# Patient Record
Sex: FEMALE | Race: WHITE | Employment: OTHER | URBAN - METROPOLITAN AREA
[De-identification: names, ages, dates, MRNs, and addresses within clinical notes are randomized per-mention and may not be internally consistent; named-entity substitution may affect disease eponyms.]

---

## 2022-11-08 RX ORDER — LOSARTAN POTASSIUM AND HYDROCHLOROTHIAZIDE 25; 100 MG/1; MG/1
1 TABLET ORAL DAILY
COMMUNITY

## 2022-11-08 RX ORDER — MULTIVITAMIN WITH IRON
250 TABLET ORAL NIGHTLY
COMMUNITY

## 2022-11-08 RX ORDER — CETIRIZINE HYDROCHLORIDE 10 MG/1
1 CAPSULE, LIQUID FILLED ORAL DAILY
COMMUNITY

## 2022-11-08 NOTE — PROGRESS NOTES
DOS 22   47    DR. LEI'S OFFICE CALLED TO FAX H&P AND PRE-OP DOS ORDERS(PRE-OP ORDERS WILL NEED TO BE PUT IN WHEN RECEIVED)      UPDATE   Orders in epic for DARNELL Lei's office contacted and message left with Jessica Cheatham to let Aneta Chow is working from home that we do not have office notes for DOS.

## 2022-11-08 NOTE — PROGRESS NOTES
[address]  time____0630 FOR NEEDLE LOC AT 0800________        Surgery time______1000______    Take the following medications with a sip of water: Follow your MD/Surgeons pre-procedure instructions regarding your medications     Do not eat or drink anything after 12:00 midnight prior to your surgery. This includes water chewing gum, mints and ice chips. You may brush your teeth and gargle the morning of your surgery, but do not swallow the water     Please see your family doctor/pediatrician for a history and physical and/or concerning medications. Bring any test results/reports from your physicians office. If you are under the care of a heart doctor or specialist doctor, please be aware that you may be asked to them for clearance    You may be asked to stop blood thinners such as Coumadin, Plavix, Fragmin, Lovenox, etc., or any anti-inflammatories such as:  Aspirin, Ibuprofen, Advil, Naproxen prior to your surgery. We also ask that you stop any OTC medications such as fish oil, vitamin E, glucosamine, garlic, Multivitamins, COQ 10, etc.    We ask that you do not smoke 24 hours prior to surgery  We ask that you do not  drink any alcoholic beverages 24 hours prior to surgery     You must make arrangements for a responsible adult to take you home after your surgery. For your safety you will not be allowed to leave alone or drive yourself home. Your surgery will be cancelled if you do not have a ride home. Also for your safety, it is strongly suggested that someone stay with you the first 24 hours after your surgery. A parent or legal guardian must accompany a child scheduled for surgery and plan to stay at the hospital until the child is discharged. Please do not bring other children with you. For your comfort, please wear simple loose fitting clothing to the hospital.  Please do not bring valuables.     Do not wear any make-up or nail polish on your fingers or toes      For your safety, please do not wear any jewelry or body piercing's on the day of surgery. All jewelry must be removed. If you have dentures, they will be removed before going to operating room. For your convenience, we will provide you with a container. If you wear contact lenses or glasses, they will be removed, please bring a case for them. If you have a living will and a durable power of  for healthcare, please bring in a copy. As part of our patient safety program to minimize surgical site infections, we ask you to do the following:    Please notify your surgeon if you develop any illness between         now and the  day of your surgery. This includes a cough, cold, fever, sore throat, nausea,         or vomiting, and diarrhea, etc.   Please notify your surgeon if you experience dizziness, shortness         of breath or blurred vision between now and the time of your surgery. Do not shave your operative site 96 hours prior to surgery. For face and neck surgery, men may use an electric razor 48 hours   prior to surgery. You may shower the night before surgery or the morning of   your surgery with an antibacterial soap. You will need to bring a photo ID and insurance card    [facility] has an onsite pharmacy, would you like to utilize our pharmacy     If you will be staying overnight and use a C-pap machine, please bring   your C-pap to hospital     Our goal is to provide you with excellent care, therefore, visitors will be limited to two(2) in the room at a time so that we may focus on providing this care for you. Please contact pre-admission testing if you have any further questions.                  Mackenzie Celaya phone number:  [address] [facility] fax number:  [phone]  Please note these are generalized instructions for all surgical cases, you may be provided with more specific instructions according to your surgery. C-Difficile admission screening and protocol:       * Admitted with diarrhea? [] YES    [x]  NO     *Prior history of C-Diff. In last 3 months? [] YES    [x]  NO     *Antibiotic use in the past 6-8 weeks? [x]  NO    []  YES                 If yes, which ANTIBIOTIC AND REASON______     *Prior hospitalization or nursing home in the last month? []  YES    [x]  NO        SAFETY FIRST. .call before you fall

## 2022-11-16 ENCOUNTER — ANESTHESIA EVENT (OUTPATIENT)
Dept: OPERATING ROOM | Age: 77
End: 2022-11-16
Payer: MEDICARE

## 2022-11-17 ENCOUNTER — HOSPITAL ENCOUNTER (OUTPATIENT)
Dept: WOMENS IMAGING | Age: 77
Discharge: HOME | End: 2022-11-17
Attending: SURGERY
Payer: MEDICARE

## 2022-11-17 ENCOUNTER — APPOINTMENT (OUTPATIENT)
Dept: WOMENS IMAGING | Age: 77
End: 2022-11-17
Attending: SURGERY
Payer: MEDICARE

## 2022-11-17 ENCOUNTER — HOSPITAL ENCOUNTER (OUTPATIENT)
Age: 77
Setting detail: OUTPATIENT SURGERY
Discharge: HOME | End: 2022-11-17
Attending: SURGERY | Admitting: SURGERY
Payer: MEDICARE

## 2022-11-17 ENCOUNTER — ANESTHESIA (OUTPATIENT)
Dept: OPERATING ROOM | Age: 77
End: 2022-11-17
Payer: MEDICARE

## 2022-11-17 VITALS
HEART RATE: 76 BPM | TEMPERATURE: 97.8 F | DIASTOLIC BLOOD PRESSURE: 75 MMHG | WEIGHT: 173.72 LBS | HEIGHT: 63 IN | RESPIRATION RATE: 18 BRPM | OXYGEN SATURATION: 97 % | BODY MASS INDEX: 30.78 KG/M2 | SYSTOLIC BLOOD PRESSURE: 144 MMHG

## 2022-11-17 DIAGNOSIS — D05.12 DUCTAL CARCINOMA IN SITU (DCIS) OF LEFT BREAST: ICD-10-CM

## 2022-11-17 DIAGNOSIS — R92.8 ABNORMAL MAMMOGRAM: ICD-10-CM

## 2022-11-17 DIAGNOSIS — C50.912 MALIGNANT NEOPLASM OF LEFT FEMALE BREAST, UNSPECIFIED ESTROGEN RECEPTOR STATUS, UNSPECIFIED SITE OF BREAST (HCC): ICD-10-CM

## 2022-11-17 LAB
CHOLESTEROL, TOTAL: 182 MG/DL (ref 0–199)
HCT VFR BLD CALC: 34.6 % (ref 36–48)
HDLC SERPL-MCNC: 54 MG/DL (ref 40–60)
HEMOGLOBIN: 11.2 G/DL (ref 12–16)
LDL CHOLESTEROL CALCULATED: 111 MG/DL
MCH RBC QN AUTO: 26.6 PG (ref 26–34)
MCHC RBC AUTO-ENTMCNC: 32.3 G/DL (ref 31–36)
MCV RBC AUTO: 82.1 FL (ref 80–100)
PDW BLD-RTO: 15.8 % (ref 12.4–15.4)
PLATELET # BLD: 259 K/UL (ref 135–450)
PMV BLD AUTO: 8.1 FL (ref 5–10.5)
RBC # BLD: 4.21 M/UL (ref 4–5.2)
TRIGL SERPL-MCNC: 84 MG/DL (ref 0–150)
TSH SERPL DL<=0.05 MIU/L-ACNC: 6.26 UIU/ML (ref 0.27–4.2)
VITAMIN D 25-HYDROXY: 34.3 NG/ML
VLDLC SERPL CALC-MCNC: 17 MG/DL
WBC # BLD: 6.1 K/UL (ref 4–11)

## 2022-11-17 PROCEDURE — 3700000001 HC ADD 15 MINUTES (ANESTHESIA): Performed by: SURGERY

## 2022-11-17 PROCEDURE — 3700000000 HC ANESTHESIA ATTENDED CARE: Performed by: SURGERY

## 2022-11-17 PROCEDURE — 85027 COMPLETE CBC AUTOMATED: CPT

## 2022-11-17 PROCEDURE — 2500000003 HC RX 250 WO HCPCS

## 2022-11-17 PROCEDURE — 2709999900 HC NON-CHARGEABLE SUPPLY: Performed by: SURGERY

## 2022-11-17 PROCEDURE — 76098 X-RAY EXAM SURGICAL SPECIMEN: CPT

## 2022-11-17 PROCEDURE — 84443 ASSAY THYROID STIM HORMONE: CPT

## 2022-11-17 PROCEDURE — 2500000003 HC RX 250 WO HCPCS: Performed by: SURGERY

## 2022-11-17 PROCEDURE — A4217 STERILE WATER/SALINE, 500 ML: HCPCS | Performed by: SURGERY

## 2022-11-17 PROCEDURE — 7100000010 HC PHASE II RECOVERY - FIRST 15 MIN: Performed by: SURGERY

## 2022-11-17 PROCEDURE — 88342 IMHCHEM/IMCYTCHM 1ST ANTB: CPT

## 2022-11-17 PROCEDURE — 2580000003 HC RX 258: Performed by: ANESTHESIOLOGY

## 2022-11-17 PROCEDURE — 88305 TISSUE EXAM BY PATHOLOGIST: CPT

## 2022-11-17 PROCEDURE — 7100000001 HC PACU RECOVERY - ADDTL 15 MIN: Performed by: SURGERY

## 2022-11-17 PROCEDURE — 6360000002 HC RX W HCPCS

## 2022-11-17 PROCEDURE — 3600000013 HC SURGERY LEVEL 3 ADDTL 15MIN: Performed by: SURGERY

## 2022-11-17 PROCEDURE — 82306 VITAMIN D 25 HYDROXY: CPT

## 2022-11-17 PROCEDURE — 88307 TISSUE EXAM BY PATHOLOGIST: CPT

## 2022-11-17 PROCEDURE — C1819 TISSUE LOCALIZATION-EXCISION: HCPCS

## 2022-11-17 PROCEDURE — 2580000003 HC RX 258: Performed by: SURGERY

## 2022-11-17 PROCEDURE — 6370000000 HC RX 637 (ALT 250 FOR IP): Performed by: ANESTHESIOLOGY

## 2022-11-17 PROCEDURE — 88341 IMHCHEM/IMCYTCHM EA ADD ANTB: CPT

## 2022-11-17 PROCEDURE — 80061 LIPID PANEL: CPT

## 2022-11-17 PROCEDURE — 88360 TUMOR IMMUNOHISTOCHEM/MANUAL: CPT

## 2022-11-17 PROCEDURE — 6360000002 HC RX W HCPCS: Performed by: SURGERY

## 2022-11-17 PROCEDURE — 3600000003 HC SURGERY LEVEL 3 BASE: Performed by: SURGERY

## 2022-11-17 PROCEDURE — 7100000011 HC PHASE II RECOVERY - ADDTL 15 MIN: Performed by: SURGERY

## 2022-11-17 PROCEDURE — 7100000000 HC PACU RECOVERY - FIRST 15 MIN: Performed by: SURGERY

## 2022-11-17 RX ORDER — HYDROCODONE BITARTRATE AND ACETAMINOPHEN 5; 325 MG/1; MG/1
1 TABLET ORAL EVERY 4 HOURS PRN
Qty: 8 TABLET | Refills: 0 | Status: SHIPPED | OUTPATIENT
Start: 2022-11-17 | End: 2022-11-22

## 2022-11-17 RX ORDER — EPHEDRINE SULFATE/0.9% NACL/PF 50 MG/5 ML
SYRINGE (ML) INTRAVENOUS PRN
Status: DISCONTINUED | OUTPATIENT
Start: 2022-11-17 | End: 2022-11-17 | Stop reason: SDUPTHER

## 2022-11-17 RX ORDER — MAGNESIUM HYDROXIDE 1200 MG/15ML
LIQUID ORAL CONTINUOUS PRN
Status: COMPLETED | OUTPATIENT
Start: 2022-11-17 | End: 2022-11-17

## 2022-11-17 RX ORDER — ONDANSETRON 2 MG/ML
INJECTION INTRAMUSCULAR; INTRAVENOUS PRN
Status: DISCONTINUED | OUTPATIENT
Start: 2022-11-17 | End: 2022-11-17 | Stop reason: SDUPTHER

## 2022-11-17 RX ORDER — SODIUM CHLORIDE 0.9 % (FLUSH) 0.9 %
5-40 SYRINGE (ML) INJECTION PRN
Status: DISCONTINUED | OUTPATIENT
Start: 2022-11-17 | End: 2022-11-17 | Stop reason: HOSPADM

## 2022-11-17 RX ORDER — SODIUM CHLORIDE 9 MG/ML
INJECTION, SOLUTION INTRAVENOUS PRN
Status: DISCONTINUED | OUTPATIENT
Start: 2022-11-17 | End: 2022-11-17 | Stop reason: HOSPADM

## 2022-11-17 RX ORDER — SODIUM CHLORIDE 9 MG/ML
INJECTION, SOLUTION INTRAVENOUS CONTINUOUS
Status: DISCONTINUED | OUTPATIENT
Start: 2022-11-17 | End: 2022-11-17 | Stop reason: HOSPADM

## 2022-11-17 RX ORDER — DEXAMETHASONE SODIUM PHOSPHATE 4 MG/ML
INJECTION, SOLUTION INTRA-ARTICULAR; INTRALESIONAL; INTRAMUSCULAR; INTRAVENOUS; SOFT TISSUE PRN
Status: DISCONTINUED | OUTPATIENT
Start: 2022-11-17 | End: 2022-11-17 | Stop reason: SDUPTHER

## 2022-11-17 RX ORDER — LIDOCAINE HYDROCHLORIDE 10 MG/ML
INJECTION, SOLUTION INFILTRATION; PERINEURAL
Status: COMPLETED | OUTPATIENT
Start: 2022-11-17 | End: 2022-11-17

## 2022-11-17 RX ORDER — CEPHALEXIN 500 MG/1
500 CAPSULE ORAL 2 TIMES DAILY
Qty: 14 CAPSULE | Refills: 0 | Status: SHIPPED | OUTPATIENT
Start: 2022-11-17

## 2022-11-17 RX ORDER — PROPOFOL 10 MG/ML
INJECTION, EMULSION INTRAVENOUS PRN
Status: DISCONTINUED | OUTPATIENT
Start: 2022-11-17 | End: 2022-11-17 | Stop reason: SDUPTHER

## 2022-11-17 RX ORDER — OXYCODONE HYDROCHLORIDE 5 MG/1
5 TABLET ORAL
Status: COMPLETED | OUTPATIENT
Start: 2022-11-17 | End: 2022-11-17

## 2022-11-17 RX ORDER — SODIUM CHLORIDE 0.9 % (FLUSH) 0.9 %
5-40 SYRINGE (ML) INJECTION EVERY 12 HOURS SCHEDULED
Status: DISCONTINUED | OUTPATIENT
Start: 2022-11-17 | End: 2022-11-17 | Stop reason: HOSPADM

## 2022-11-17 RX ORDER — LIDOCAINE HYDROCHLORIDE 20 MG/ML
INJECTION, SOLUTION EPIDURAL; INFILTRATION; INTRACAUDAL; PERINEURAL PRN
Status: DISCONTINUED | OUTPATIENT
Start: 2022-11-17 | End: 2022-11-17 | Stop reason: SDUPTHER

## 2022-11-17 RX ORDER — ONDANSETRON 2 MG/ML
4 INJECTION INTRAMUSCULAR; INTRAVENOUS
Status: DISCONTINUED | OUTPATIENT
Start: 2022-11-17 | End: 2022-11-17 | Stop reason: HOSPADM

## 2022-11-17 RX ORDER — FENTANYL CITRATE 50 UG/ML
INJECTION, SOLUTION INTRAMUSCULAR; INTRAVENOUS PRN
Status: DISCONTINUED | OUTPATIENT
Start: 2022-11-17 | End: 2022-11-17 | Stop reason: SDUPTHER

## 2022-11-17 RX ORDER — ONDANSETRON 4 MG/1
4 TABLET, FILM COATED ORAL EVERY 8 HOURS PRN
Qty: 2 TABLET | Refills: 1 | Status: SHIPPED | OUTPATIENT
Start: 2022-11-17

## 2022-11-17 RX ORDER — FENTANYL CITRATE 50 UG/ML
25 INJECTION, SOLUTION INTRAMUSCULAR; INTRAVENOUS EVERY 5 MIN PRN
Status: DISCONTINUED | OUTPATIENT
Start: 2022-11-17 | End: 2022-11-17 | Stop reason: HOSPADM

## 2022-11-17 RX ADMIN — PROPOFOL 100 MG: 10 INJECTION, EMULSION INTRAVENOUS at 10:19

## 2022-11-17 RX ADMIN — ONDANSETRON 4 MG: 2 INJECTION INTRAMUSCULAR; INTRAVENOUS at 10:24

## 2022-11-17 RX ADMIN — Medication 5 MG: at 10:57

## 2022-11-17 RX ADMIN — OXYCODONE 5 MG: 5 TABLET ORAL at 12:38

## 2022-11-17 RX ADMIN — DEXAMETHASONE SODIUM PHOSPHATE 6 MG: 4 INJECTION, SOLUTION INTRAMUSCULAR; INTRAVENOUS at 10:24

## 2022-11-17 RX ADMIN — Medication 10 MG: at 10:48

## 2022-11-17 RX ADMIN — SODIUM CHLORIDE: 9 INJECTION, SOLUTION INTRAVENOUS at 07:47

## 2022-11-17 RX ADMIN — CEFAZOLIN 2000 MG: 2 INJECTION, POWDER, FOR SOLUTION INTRAMUSCULAR; INTRAVENOUS at 10:25

## 2022-11-17 RX ADMIN — FENTANYL CITRATE 50 MCG: 50 INJECTION INTRAMUSCULAR; INTRAVENOUS at 10:19

## 2022-11-17 RX ADMIN — FENTANYL CITRATE 50 MCG: 50 INJECTION INTRAMUSCULAR; INTRAVENOUS at 10:38

## 2022-11-17 RX ADMIN — LIDOCAINE HYDROCHLORIDE 100 MG: 20 INJECTION, SOLUTION EPIDURAL; INFILTRATION; INTRACAUDAL; PERINEURAL at 10:19

## 2022-11-17 ASSESSMENT — PAIN DESCRIPTION - LOCATION
LOCATION: BREAST

## 2022-11-17 ASSESSMENT — PAIN DESCRIPTION - ONSET
ONSET: ON-GOING

## 2022-11-17 ASSESSMENT — PAIN DESCRIPTION - ORIENTATION
ORIENTATION: LEFT

## 2022-11-17 ASSESSMENT — PAIN - FUNCTIONAL ASSESSMENT
PAIN_FUNCTIONAL_ASSESSMENT: PREVENTS OR INTERFERES SOME ACTIVE ACTIVITIES AND ADLS
PAIN_FUNCTIONAL_ASSESSMENT: 0-10
PAIN_FUNCTIONAL_ASSESSMENT: PREVENTS OR INTERFERES SOME ACTIVE ACTIVITIES AND ADLS

## 2022-11-17 ASSESSMENT — PAIN DESCRIPTION - FREQUENCY
FREQUENCY: INTERMITTENT

## 2022-11-17 ASSESSMENT — PAIN DESCRIPTION - PAIN TYPE
TYPE: SURGICAL PAIN

## 2022-11-17 ASSESSMENT — PAIN SCALES - GENERAL
PAINLEVEL_OUTOF10: 5
PAINLEVEL_OUTOF10: 3
PAINLEVEL_OUTOF10: 5
PAINLEVEL_OUTOF10: 4

## 2022-11-17 ASSESSMENT — PAIN DESCRIPTION - DESCRIPTORS
DESCRIPTORS: ACHING

## 2022-11-17 ASSESSMENT — PAIN SCALES - WONG BAKER: WONGBAKER_NUMERICALRESPONSE: 0

## 2022-11-17 NOTE — PROGRESS NOTES
Prescriptions sent to retail pharmacy per patient request.       Electronically signed by Cintia Cunningham RN on 11/17/2022 at 12:01 PM

## 2022-11-17 NOTE — PROGRESS NOTES
PACU Transfer Note    Vitals:    11/17/22 1201   BP: (!) 149/71   Pulse: 68   Resp: (!) 9   Temp: 97.2 °F (36.2 °C)   SpO2: 98%       In: 400 [I.V.:350]  Out: 15     Pain assessment:  level of pain (1-10, 10 severe), 5- requesting pain pill in phase 2  Pain Level: 5 (requesting pain pill in phase 2)    Report given to Receiving unit RN.    11/17/2022 12:02 PM        Electronically signed by Renetta Farias RN on 11/17/2022 at 12:03 PM

## 2022-11-17 NOTE — H&P
Date of Surgery Update:  Yeison Teague was seen, history and physical examination reviewed, and patient examined by me today. There have been no significant clinical changes since the completion of the previous history and physical. The surgical site was confirmed by the patient and me. I have presented reasonable alternatives to the patient's proposed care, treatment, and services. The discussion I have done encompassed risks, benefits, and side effects related to the alternatives and the risks related to not receiving the proposed care, treatment, and services. All questions answered. Patient wishes to proceed.      Electronically signed by: Agustin Sheldon MD,11/17/2022,7:30 AM

## 2022-11-17 NOTE — PROGRESS NOTES
Assisted up to chair. Discharge instructions, including drain care,  given to patient and son. Verbalize understanding with correct teach back. Supplies given.

## 2022-11-17 NOTE — ANESTHESIA PRE PROCEDURE
[facility] Department of Anesthesiology  Pre-Anesthesia Evaluation/Consultation       Name:  Marisel Mejia  : 1945  Age:  78 y.o. MRN:  [id number]  Date: 2022           Surgeon: Surgeon(s):  Amie Terry MD    Procedure: Procedure(s):  LEFT BREAST NEEDLE LOCALIZATION MAMMOGRAM GUIDED, LUMPECTOMY, LEFT SENTINEL NODE BIOPSY (BLUE DYE ONLY)     Allergies   Allergen Reactions    Sertraline      UNABLE TO RECALL REACTION    Pcn [Penicillins] Rash     There is no problem list on file for this patient. Past Medical History:   Diagnosis Date    Hypertension      Past Surgical History:   Procedure Laterality Date    CATARACT REMOVAL Bilateral     COLONOSCOPY      DILATION AND CURETTAGE OF UTERUS  1971     Social History     Tobacco Use    Smoking status: Never    Smokeless tobacco: Never   Vaping Use    Vaping Use: Never used   Substance Use Topics    Alcohol use: Yes     Comment: RARE    Drug use: Never     Medications  No current facility-administered medications on file prior to encounter.      Current Outpatient Medications on File Prior to Encounter   Medication Sig Dispense Refill    losartan-hydroCHLOROthiazide (HYZAAR) 100-25 MG per tablet Take 1 tablet by mouth daily      Multiple Vitamin (MULTIVITAMIN ADULT PO) Take 1 tablet by mouth daily      Cetirizine HCl (ZYRTEC ALLERGY) 10 MG CAPS Take 1 tablet by mouth daily      magnesium (MAGNESIUM-OXIDE) 250 MG TABS tablet Take 250 mg by mouth at bedtime       Current Facility-Administered Medications   Medication Dose Route Frequency Provider Last Rate Last Admin    0.9 % sodium chloride infusion   IntraVENous Continuous Hasmukh Weston  mL/hr at 22 0747 New Bag at 22 0747    sodium chloride flush 0.9 % injection 5-40 mL  5-40 mL IntraVENous 2 times per day Hasmukh Weston MD        sodium chloride flush 0.9 % injection 5-40 mL  5-40 mL IntraVENous PRN Hasmukh Weston MD  0.9 % sodium chloride infusion   IntraVENous PRN Kami Em MD        ceFAZolin (ANCEF) 2,000 mg in dextrose 5 % 50 mL IVPB (mini-bag)  2,000 mg IntraVENous Once Nadra Olivo MD         Vital Signs (Current)   Vitals:    22   BP: (!) 158/71   Pulse: 95   Resp: 18   Temp: 96.9 °F (36.1 °C)   SpO2: 95%     Vital Signs Statistics (for past 48 hrs)     Temp  Av.9 °F (36.1 °C)  Min: 96.9 °F (36.1 °C)   Min taken time: 22  Max: 96.9 °F (36.1 °C)   Max taken time: 22  Pulse  Av  Min: 95   Min taken time: 22  Max: 95   Max taken time: 22  Resp  Av  Min: 25   Min taken time: 22  Max: 25   Max taken time: 22  BP  Min: 158/71   Min taken time: 22  Max: 158/71   Max taken time: 22  SpO2  Av %  Min: 95 %   Min taken time: 22  Max: 95 %   Max taken time: 22    BP Readings from Last 3 Encounters:   22 (!) 158/71     BMI  Body mass index is 30.77 kg/m². Estimated body mass index is 30.77 kg/m² as calculated from the following:    Height as of this encounter: 5' 3\" (1.6 m). Weight as of this encounter: 173 lb 11.6 oz (78.8 kg). CBC No results found for: WBC, RBC, HGB, HCT, MCV, RDW, PLT  CMP  No results found for: NA, K, CL, CO2, BUN, CREATININE, GFRAA, AGRATIO, LABGLOM, GLUCOSE, GLU, PROT, CALCIUM, BILITOT, ALKPHOS, AST, ALT  BMP  No results found for: NA, K, CL, CO2, BUN, CREATININE, CALCIUM, GFRAA, LABGLOM, GLUCOSE, GLU  POCGlucose  No results for input(s): GLUCOSE in the last 72 hours.    Coags  No results found for: PROTIME, INR, APTT  HCG (If Applicable) No results found for: PREGTESTUR, PREGSERUM, HCG, HCGQUANT   ABGs No results found for: PHART, PO2ART, JRO9FLX, VBX6ORP, BEART, A2CSYVWQ   Type & Screen (If Applicable)  No results found for: LABABO, LABRH                         BMI: Wt Readings from Last 3 Encounters:       NPO Status:   Date of last liquid consumption: 11/15/22   Time of last liquid consumption: 2300   Date of last solid food consumption: 11/16/22      Time of last solid consumption: 2300       Anesthesia Evaluation  Patient summary reviewed no history of anesthetic complications:   Airway: Mallampati: III  TM distance: >3 FB   Neck ROM: full  Mouth opening: > = 3 FB   Dental:    (+) upper dentures      Pulmonary:Negative Pulmonary ROS and normal exam                               Cardiovascular:  Exercise tolerance: good (>4 METS),   (+) hypertension:,         Rhythm: regular  Rate: normal           Beta Blocker:  Not on Beta Blocker         Neuro/Psych:   Negative Neuro/Psych ROS              GI/Hepatic/Renal: Neg GI/Hepatic/Renal ROS       (-) GERD       Endo/Other: Negative Endo/Other ROS                    Abdominal:             Vascular: negative vascular ROS. Other Findings:           Anesthesia Plan      general     ASA 3       Induction: intravenous. MIPS: Postoperative opioids intended and Prophylactic antiemetics administered. Anesthetic plan and risks discussed with patient. Plan discussed with CRNA. This pre-anesthesia assessment may be used as a history and physical.    DOS STAFF ADDENDUM:    Pt seen and examined, chart reviewed (including anesthesia, drug and allergy history). No interval changes to history and physical examination. Anesthetic plan, risks, benefits, alternatives, and personnel involved discussed with patient. Questions and concerns addressed. Patient(family) verbalized an understanding and agrees to proceed.       Kirit Hinds MD  November 17, 2022  7:50 AM

## 2022-11-17 NOTE — DISCHARGE INSTRUCTIONS
1. Remove dressings 11/19/2022, leave steristrips in place  2. Ice pack to left breast  3. Record drain output 2-3 times daily, Strip drain when emptying  4. Do not rinse bulb or tubing! 5. No driving x 2 days or while taking pain meds  6. No lifting > 10 lbs with left arm while drain in place  7. Call office on 11/22/2022 to report drain output, follow up will be scheduled at that time. [phone]  8. Call if problems or questions [phone]  9. May shower  10.  May cover drain site with gauze and tape as needed

## 2022-11-17 NOTE — BRIEF OP NOTE
Brief Postoperative Note    Name           : Jackie Murdock  YOB: 1945  MRN             : [id number]    Pre-operative Diagnosis: 1. Left breast cancer UOQ    Post-operative Diagnosis: Same    Procedure: 1. Left N/L lumpectomy  2. Left deep sentinel node biopsy      Anesthesia: General and Local    Surgeons/Assistants: Ramírez    Estimated Blood Loss: less than 50     Complications: None    Specimens: Was Obtained: 1. Left breast mass UOQ  2. Superior margin  3. Inferior margin  4. Medial margin  5. Lateral margin  6. Posterior margin  7. Anterior margin  8.  Left sentinel nodes    Findings: same as post op    Drains: 10 mm zackary x 1     Electronically signed by Lashell Cheek MD on 11/17/2022 at 10:58 AM

## 2022-11-17 NOTE — PROGRESS NOTES
NAME:  Alexy Pierre  YOB: 1945  MEDICAL RECORD NUMBER:  [id number]  EPISODE DATE:  11/17/2022                             Expiration date of localization device was verified . Packaging intact with no observable damage. Needle Localization performed by Dr. Octavio Mancini for left breast site. Site dressed with cradle wire protection dressing. Patient tolerated procedure well; alert and oriented x3. Patient was transported back to pre-op area.        Electronically signed by Daryl Coulter RN on 11/17/2022 at 8:34 AM

## 2022-11-17 NOTE — PROGRESS NOTES
Patient admitted to PACU # 8 from OR at 1110 post LEFT BREAST NEEDLE LOCALIZATION MAMMOGRAM GUIDED, LUMPECTOMY, LEFT SENTINEL NODE BIOPSY (BLUE DYE ONLY) - Left per Dr. Jessica Lehman. Attached to PACU monitoring system and report received from anesthesia provider. Patient was reported to be hemodynamically stable during procedure.   OPA in place upon admission      Electronically signed by Romel Baneulos RN on 11/17/2022 at 11:13 AM

## 2022-11-17 NOTE — ANESTHESIA POSTPROCEDURE EVALUATION
Department of Anesthesiology  Postprocedure Note    Patient: Alexa Garcia  MRN: [id number]  YOB: 1945  Date of evaluation: 11/17/2022      Procedure Summary     Date: 11/17/22 Room / Location: [address]    Anesthesia Start: 0780 Anesthesia Stop: 6833    Procedure: LEFT BREAST NEEDLE LOCALIZATION MAMMOGRAM GUIDED, LUMPECTOMY, LEFT SENTINEL NODE BIOPSY ([address]) (Left: Breast) Diagnosis:       Malignant neoplasm of left female breast, unspecified estrogen receptor status, unspecified site of breast ([facility] [address]      Ductal carcinoma in situ (DCIS) of left breast      (LEFT BREAST CANCER, LEFT DUCTAL CARCINOMA INSITU)    Surgeons: Freeman Espino MD Responsible Provider: Evita Ricci MD    Anesthesia Type: general ASA Status: 3          Anesthesia Type: No value filed.     Calista Phase I: Calista Score: 10    Calista Phase II: Calista Score: 10      Anesthesia Post Evaluation    Patient location during evaluation: PACU  Patient participation: complete - patient participated  Level of consciousness: awake and alert  Pain score: 2  Airway patency: patent  Nausea & Vomiting: no nausea and no vomiting  Complications: no  Cardiovascular status: blood pressure returned to baseline  Respiratory status: acceptable  Hydration status: euvolemic

## 2022-11-23 NOTE — OP NOTE
[address] [place] [address] Buddy Streeter                                 OPERATIVE REPORT    PATIENT NAME: Rob Guzman                :        1945  MED REC NO:   [id number]                          ROOM:  ACCOUNT NO:   [de-identified]                           ADMIT DATE: 2022  PROVIDER:     Pratik Ghosh MD    DATE OF PROCEDURE:  2022    PREOPERATIVE DIAGNOSIS:  Left breast cancer, upper outer quadrant. POSTOPERATIVE DIAGNOSIS:  Left breast cancer, upper outer quadrant. PROCEDURES:  1. Left needle localization lumpectomy. 2.  Left deep sentinel lymph node biopsy. SURGEON:  Pratik Ghosh MD    ESTIMATED BLOOD LOSS:  Less than 50 mL. ANESTHESIA:  General plus 30 mL 1% Xylocaine. SPECIMENS:  1. Left breast mass, upper outer quadrant. 2.  Superior margin. 3.  Inferior margin. 4.  Medial margin. 5.  Lateral margin. 6.  Posterior margin. 7.  Anterior margin. 8.  Left sentinel lymph nodes. TUBES AN DRAINS:  A 10-mm Brenton drain x1. INDICATIONS FOR PROCEDURE:  The patient is a 47-year-old female who on  recent mammogram was noted to have two small densities in the upper  outer aspect of the left breast.  She underwent an ultrasound and was  found to have two small solid masses at 1 and 2 o'clock position of the  left breast.  She underwent ultrasound core biopsy which revealed an  area of DCIS as well as a small invasive ductal carcinoma. After  discussing treatment options, she is felt to be an excellent candidate  for breast conservation and presents today for surgery. DESCRIPTION OF PROCEDURE:  The patient was brought to the operating room  and placed on the OR table in supine position. Following the induction  of general anesthesia, 2 mL of methylene blue were injected at the 1  o'clock position of the left breast along the areolar border.   The  patient's left breast and axilla were prepped with Betadine and draped  in the usual sterile manner. A curvilinear incision was made in the  upper outer aspect of the left breast including the needle insertion  site within the lumpectomy incision. The needle was grasped with an  Allis clamp and stabilized. Dissection was carried along the course of  the needle removing approximately 2 cm of tissue surrounding the  entirety of the needle. The specimen was marked with a short suture on  the superior margin and long suture on the lateral margin. One of the  biopsy cavities was visualized within the initial lumpectomy specimen. The specimen was sent to x-ray and initially the clips were not  identified in the specimen. Additional tissue was taken from the  superior, inferior, medial, lateral, anterior, and posterior margins. The anterior margin was returned to x-ray and the remaining clip was  identified. The clavipectoral fascia was easily accessible through the lumpectomy  incision. Dissection was carried superior laterally into the axilla. Upon opening the clavipectoral fascia, a blue-stained afferent lymphatic  channel was followed distally and two blue-stained lymph nodes were  identified. Vascular lymphatic channels were clipped as they were  encountered. Further palpation of the axilla showed no other concerning  lymph nodes. No other blue-stained lymph nodes were identified. A  10-mm Brenton drain was placed through a separate stab incision in the  anterior axillary line and anchored into place with a 2-0 silk suture. Hemoclips were left on all margins of lumpectomy cavity. The  subcutaneous tissue was closed with interrupted 3-0 Vicryl suture and  the skin was closed with 4-0 Vicryl subcuticular stitch. Benzoin,  Steris, dry sterile gauze and Tegaderm dressing were applied. All  needle and sponge counts were correct. The patient was returned to  Recovery in good condition and I was present for the entire procedure.         Reid Sanchez Deandre Russell MD    D: 11/22/2022 19:22:34       T: 11/22/2022 19:25:48     [email or web address]  Job#: [id number]     Doc#: [id number]    CC:  Adrien Madrid MD

## 2023-06-01 ENCOUNTER — HOSPITAL ENCOUNTER (OUTPATIENT)
Dept: WOMENS IMAGING | Age: 78
Discharge: HOME | End: 2023-06-01
Payer: MEDICARE

## 2023-06-01 DIAGNOSIS — Z85.3 HISTORY OF BREAST CANCER: ICD-10-CM

## 2023-06-01 PROCEDURE — G0279 TOMOSYNTHESIS, MAMMO: HCPCS

## 2023-12-14 ENCOUNTER — HOSPITAL ENCOUNTER (OUTPATIENT)
Dept: WOMENS IMAGING | Age: 78
Discharge: HOME | End: 2023-12-14
Payer: MEDICARE

## 2023-12-14 DIAGNOSIS — R92.8 ABNORMAL MAMMOGRAM: ICD-10-CM

## 2023-12-14 PROCEDURE — G0279 TOMOSYNTHESIS, MAMMO: HCPCS

## 2024-06-13 ENCOUNTER — HOSPITAL ENCOUNTER (OUTPATIENT)
Dept: WOMENS IMAGING | Age: 79
Discharge: HOME | End: 2024-06-13
Payer: MEDICARE

## 2024-06-13 DIAGNOSIS — Z12.31 SCREENING MAMMOGRAM FOR BREAST CANCER: ICD-10-CM

## 2024-06-13 PROCEDURE — G0279 TOMOSYNTHESIS, MAMMO: HCPCS

## 2024-12-12 ENCOUNTER — HOSPITAL ENCOUNTER (OUTPATIENT)
Dept: WOMENS IMAGING | Age: 79
Discharge: HOME | End: 2024-12-12
Payer: MEDICARE

## 2024-12-12 VITALS — HEIGHT: 62 IN | WEIGHT: 170 LBS | BODY MASS INDEX: 31.28 KG/M2

## 2024-12-12 DIAGNOSIS — R92.8 ABNORMAL MAMMOGRAM: ICD-10-CM

## 2024-12-12 PROCEDURE — G0279 TOMOSYNTHESIS, MAMMO: HCPCS

## (undated) DEVICE — 3M™ STERI-STRIP™ COMPOUND BENZOIN TINCTURE 40 BAGS/CARTON 4 CARTONS/CASE C1544: Brand: 3M™ STERI-STRIP™

## (undated) DEVICE — SUTURE VCRL + SZ 4-0 L18IN ABSRB UD L19MM PS-2 3/8 CIR PRIM VCP496H

## (undated) DEVICE — SOLUTION PREP POVIDONE IOD FOR SKIN MUCOUS MEM PRIOR TO

## (undated) DEVICE — SOLUTION IV IRRIG POUR BRL 0.9% SODIUM CHL 2F7124

## (undated) DEVICE — SUTURE VCRL + SZ 3-0 L27IN ABSRB UD L26MM SH 1/2 CIR VCP416H

## (undated) DEVICE — SOLUTION SCRB 4OZ 10% POVIDONE IOD ANTIMIC BTL

## (undated) DEVICE — DRAPE,CHEST,FENES,15X10,STERIL: Brand: MEDLINE

## (undated) DEVICE — GAUZE,SPONGE,4"X4",16PLY,XRAY,STRL,LF: Brand: MEDLINE

## (undated) DEVICE — MINOR SET UP: Brand: MEDLINE INDUSTRIES, INC.

## (undated) DEVICE — NEEDLE HYPO 25GA L1.5IN BVL ORIENTED ECLIPSE

## (undated) DEVICE — PREMIUM DRY TRAY LF: Brand: MEDLINE INDUSTRIES, INC.

## (undated) DEVICE — 3M™ TEGADERM™ TRANSPARENT FILM DRESSING FRAME STYLE, 1626W, 4 IN X 4-3/4 IN (10 CM X 12 CM), 50/CT 4CT/CASE: Brand: 3M™ TEGADERM™

## (undated) DEVICE — GOWN SIRUS NONREIN XL W/TWL: Brand: MEDLINE INDUSTRIES, INC.

## (undated) DEVICE — GLOVE,SURG,SENSICARE SLT,LF,PF,7: Brand: MEDLINE

## (undated) DEVICE — CLIP LIG M TI 6 SIL HNDL FOR OPN AND ENDOSCP SGL APPL

## (undated) DEVICE — TOWEL,OR,DSP,ST,BLUE,STD,4/PK,20PK/CS: Brand: MEDLINE

## (undated) DEVICE — DRAIN WND SIL FLAT RADIOPAQUE 10MM FULL FLUTED

## (undated) DEVICE — SUTURE PERMAHAND SZ 2-0 L18IN NONABSORBABLE BLK L26MM FS 685G

## (undated) DEVICE — RESERVOIR,SUCTION,100CC,SILICONE: Brand: MEDLINE

## (undated) DEVICE — SPONGE GZ W4XL4IN COT 12 PLY TYP VII WVN C FLD DSGN

## (undated) DEVICE — CANISTER, RIGID, 1200CC: Brand: MEDLINE INDUSTRIES, INC.

## (undated) DEVICE — CLEANER,CAUTERY TIP,2X2",STERILE: Brand: MEDLINE

## (undated) DEVICE — SYRINGE MED 10ML LUERLOCK TIP W/O SFTY DISP

## (undated) DEVICE — STRIP,CLOSURE,WOUND,MEDI-STRIP,1/2X4: Brand: MEDLINE